# Patient Record
Sex: FEMALE | Employment: PART TIME | URBAN - METROPOLITAN AREA
[De-identification: names, ages, dates, MRNs, and addresses within clinical notes are randomized per-mention and may not be internally consistent; named-entity substitution may affect disease eponyms.]

---

## 2017-02-20 ENCOUNTER — MYC MEDICAL ADVICE (OUTPATIENT)
Dept: ENDOCRINOLOGY | Facility: CLINIC | Age: 67
End: 2017-02-20

## 2017-02-20 ENCOUNTER — OFFICE VISIT (OUTPATIENT)
Dept: ENDOCRINOLOGY | Facility: CLINIC | Age: 67
End: 2017-02-20

## 2017-02-20 VITALS — SYSTOLIC BLOOD PRESSURE: 120 MMHG | HEART RATE: 71 BPM | DIASTOLIC BLOOD PRESSURE: 75 MMHG | WEIGHT: 114.5 LBS

## 2017-02-20 DIAGNOSIS — E04.1 THYROID NODULE: ICD-10-CM

## 2017-02-20 DIAGNOSIS — Z79.890 ENCOUNTER FOR MONITORING TESTOSTERONE REPLACEMENT THERAPY: ICD-10-CM

## 2017-02-20 DIAGNOSIS — E04.1 THYROID NODULE: Primary | ICD-10-CM

## 2017-02-20 DIAGNOSIS — Z51.81 ENCOUNTER FOR MONITORING TESTOSTERONE REPLACEMENT THERAPY: ICD-10-CM

## 2017-02-20 DIAGNOSIS — Z13.220 SCREENING FOR LIPOID DISORDERS: ICD-10-CM

## 2017-02-20 DIAGNOSIS — Z13.820 SCREENING FOR OSTEOPOROSIS: ICD-10-CM

## 2017-02-20 DIAGNOSIS — M84.342S: ICD-10-CM

## 2017-02-20 LAB
CHOLEST SERPL-MCNC: 185 MG/DL
HDLC SERPL-MCNC: 54 MG/DL
LDLC SERPL CALC-MCNC: 109 MG/DL
NONHDLC SERPL-MCNC: 131 MG/DL
T4 FREE SERPL-MCNC: 1.12 NG/DL (ref 0.76–1.46)
THYROPEROXIDASE AB SERPL-ACNC: 23 IU/ML
TRIGL SERPL-MCNC: 109 MG/DL
TSH SERPL DL<=0.05 MIU/L-ACNC: 0.46 MU/L (ref 0.4–4)

## 2017-02-20 PROCEDURE — 84403 ASSAY OF TOTAL TESTOSTERONE: CPT | Performed by: INTERNAL MEDICINE

## 2017-02-20 PROCEDURE — 86376 MICROSOMAL ANTIBODY EACH: CPT | Performed by: INTERNAL MEDICINE

## 2017-02-20 PROCEDURE — 84270 ASSAY OF SEX HORMONE GLOBUL: CPT | Performed by: INTERNAL MEDICINE

## 2017-02-20 RX ORDER — OMEGA-3-ACID ETHYL ESTERS 1 G/1
2 CAPSULE, LIQUID FILLED ORAL 2 TIMES DAILY
COMMUNITY

## 2017-02-20 RX ORDER — TESTOSTERONE 100 MG
PELLET (EA) IMPLANTATION
COMMUNITY

## 2017-02-20 ASSESSMENT — PAIN SCALES - GENERAL: PAINLEVEL: NO PAIN (0)

## 2017-02-20 NOTE — PATIENT INSTRUCTIONS
To expedite your medication refill(s), please contact your pharmacy and have them fax a refill request to: [phone].  *Please allow 3 business days for routine medication refills.  *Please allow 5 business days for controlled substance medication refills.  --------------------  For scheduling appointments (including lab work), please request an appointment through [employer], or call: [phone].    For questions for your provider or the endocrine nurse, please send a [employer] message.  For after-hours urgent issues, please dial [phone], and ask to speak with the Endocrinologist On-Call.  --------------------  Please Note: If you are active on [employer], all future test results will be sent by [employer] message only and will no longer be sent by mail. You may also receive communication directly from your physician.

## 2017-02-20 NOTE — PROGRESS NOTES
- Endocrinology Initial Consultation -    Reason for visit/consult: Thyroid nodule    Primary care provider: Neyda Yusuf    HPI: 65 yo female for the initial visit of her thyroid nodule, referred from [facility], [place] [place].   She was noted for left sided thyroid nodule by physical exams for a few years and last visit, she was told that need to be seen by endocrinologist. She is asymptomatic, no dysphagia.     Of note, she has been taking testosterone chew by compound pharmacy over 3 years, she feels more energetic with testosterone pill. She is also taking progesterone pill as well.     Patient is engaging exercise daily basis, jog, cross-country ski etc, she had left arm fracture twice in the past year. Last bone density test was 5 years ago at [facility].Currently taking Ca citrate (unknown dose) and vitamin D 1000 IU.     Energry level: pretty good, Sleep: good, Appetite: good, BW: regular, no constipation.    Past Medical/Surgical History:  No past medical history on file.  No past surgical history on file.  Two  before    Allergies:  No Known Allergies    Current Medications   Current Outpatient Prescriptions   Medication     PROGESTERONE 200 MG CAPS COMPOUND (PROGESTERONE 200 MG CAPS COMPOUND)     Testosterone 100 MG PLLT     omega-3 acid ethyl esters (LOVAZA) 1 G capsule     Glucosamine-Chondroitin--200-150 MG TABS     B Complex Vitamins (VITAMIN-B COMPLEX PO)     Ascorbic Acid (VITAMIN C PO)     VITAMIN D, CHOLECALCIFEROL, PO     No current facility-administered medications for this visit.    iodine drop once a day  Co Q10 150 mg  Alpha-lipoic acid 150 mg  Vitamin D 1000 IU    Family History:  No family history on file.  Family history of thyroid- maybe one of the sister    Social History:  Social History   Substance Use Topics     Smoking status: Not on file     Smokeless tobacco: Not on file     Alcohol use Not on  file   Lives with , 1 son, 2 daughters. Job: school system, [employer], teaching. Used to work as /     ROS:  Full review of systems taken with the help of the intake sheet. Otherwise a complete 14 point review of systems was taken and is negative unless stated in the history above.    Physical Exam:   Blood pressure 120/75, pulse 71, weight 51.9 kg (114 lb 8 oz).  General: well appearing, no acute distress, pleasant and conversant,   Mental Status/neuro: alert and oriented  Face: symmetrical, normal facial color  Eyes: anicteric, PERRL  Neck: suppler, no lymphadenopahty  Thyroid: normal-smaller size and coarse texture, left lower nodule 2cm  palpable, no bruits  Heart: regular rhythm, S1S2, no murmur appreciated  Lung: clear to auscultation bilaterally  Abdomen: soft, NT/ND, no hepatomegaly  Legs: no swelling or edema      Labs (5/4/2016 [facility]):   Estradiol: 17.5 (0-30)  Progesterone 13.3 (0.2-1.0 menopause range)  Free testosterone 5.2  Total testosterone 47.1 ()  SHBG 66.4   TSH 0.63 (0.4-2.5)  Free T4 1.2 (0.8-1.9)  Total T4 6.9 (4.5-12.5)          Assessment and Plan  66 year old female with thyroid nodule, current testosterone compound taking,    - Thyroid sonogram, then will consider whether needs FNA or not, patient will contact us once finish biospy  - TSH, free T4, TPO antibody  - free testosterone, total testosterone  - lipid panel  - Will verify the dose of calcium since she had two fractures recently.  -RTC with me in one year if no FNA    I spent 45 minutes with this patient face to face and explained the conditions and plans (more than 50% of time was counseling/coordination of care) . The patient understood and is satisfied with today's visit. Return to clinic with me in 1 year.         Ashley Dahl MD  Staff Physician  Endocrinology and Metabolism  License: [id number]

## 2017-02-20 NOTE — LETTER
2017       RE: Kerrie Aguilar  [address] [facility] [address]     Dear Colleague,    Thank you for referring your patient, Kerrie Aguilar, to the [facility] ENDOCRINOLOGY at [facility]. Please see a copy of my visit note below.                                - Endocrinology Initial Consultation -    Reason for visit/consult: Thyroid nodule    Primary care provider: Neyda Yusuf    HPI: 67 yo female for the initial visit of her thyroid nodule, referred from [facility], [facility].   She was noted for left sided thyroid nodule by physical exams for a few years and last visit, she was told that need to be seen by endocrinologist. She is asymptomatic, no dysphagia.     Of note, she has been taking testosterone chew by compound pharmacy over 3 years, she feels more energetic with testosterone pill. She is also taking progesterone pill as well.     Patient is engaging exercise daily basis, jog, cross-country ski etc, she had left arm fracture twice in the past year. Last bone density test was 5 years ago at [facility] [place].Currently taking Ca citrate (unknown dose) and vitamin D 1000 IU.     Energry level: pretty good, Sleep: good, Appetite: good, BW: regular, no constipation.    Past Medical/Surgical History:  No past medical history on file.  No past surgical history on file.  Two  before    Allergies:  No Known Allergies    Current Medications   Current Outpatient Prescriptions   Medication     PROGESTERONE 200 MG CAPS COMPOUND (PROGESTERONE 200 MG CAPS COMPOUND)     Testosterone 100 MG PLLT     omega-3 acid ethyl esters (LOVAZA) 1 G capsule     Glucosamine-Chondroitin--200-150 MG TABS     B Complex Vitamins (VITAMIN-B COMPLEX PO)     Ascorbic Acid (VITAMIN C PO)     VITAMIN D, CHOLECALCIFEROL, PO     No current facility-administered medications for this visit.    iodine drop once a day  Co Q10 150 mg  Alpha-lipoic  acid 150 mg  Vitamin D 1000 IU    Family History:  No family history on file.  Family history of thyroid- maybe one of the sister    Social History:  Social History   Substance Use Topics     Smoking status: Not on file     Smokeless tobacco: Not on file     Alcohol use Not on file   Lives with , 1 son, 2 daughters. Job: school system, [employer], teaching. Used to work as /     ROS:  Full review of systems taken with the help of the intake sheet. Otherwise a complete 14 point review of systems was taken and is negative unless stated in the history above.    Physical Exam:   Blood pressure 120/75, pulse 71, weight 51.9 kg (114 lb 8 oz).  General: well appearing, no acute distress, pleasant and conversant,   Mental Status/neuro: alert and oriented  Face: symmetrical, normal facial color  Eyes: anicteric, PERRL  Neck: suppler, no lymphadenopahty  Thyroid: normal-smaller size and coarse texture, left lower nodule 2cm  palpable, no bruits  Heart: regular rhythm, S1S2, no murmur appreciated  Lung: clear to auscultation bilaterally  Abdomen: soft, NT/ND, no hepatomegaly  Legs: no swelling or edema      Labs (5/4/2016 [facility]):   Estradiol: 17.5 (0-30)  Progesterone 13.3 (0.2-1.0 menopause range)  Free testosterone 5.2  Total testosterone 47.1 ()  SHBG 66.4   TSH 0.63 (0.4-2.5)  Free T4 1.2 (0.8-1.9)  Total T4 6.9 (4.5-12.5)          Assessment and Plan  66 year old female with thyroid nodule, current testosterone compound taking,    - Thyroid sonogram, then will consider whether needs FNA or not, patient will contact us once finish biospy  - TSH, free T4, TPO antibody  - free testosterone, total testosterone  - lipid panel  - Will verify the dose of calcium since she had two fractures recently.  -RTC with me in one year if no FNA    I spent 45 minutes with this patient face to face and explained the conditions and plans (more than 50% of time was  counseling/coordination of care) . The patient understood and is satisfied with today's visit. Return to clinic with me in 1 year.         Ashley Dahl MD  Staff Physician  Endocrinology and Metabolism  License: [id number]

## 2017-02-20 NOTE — NURSING NOTE
Chief Complaint   Patient presents with     Consult     NEW PATIENT-THYROID NODULE     Kalpana Stearns, IVETTE  Endocrinology & Diabetes 3G

## 2017-02-20 NOTE — MR AVS SNAPSHOT
After Visit Summary   2/20/2017    Kerrie Aguilar    MRN: [id number]           Patient Information     Date Of Birth          1950        Visit Information        Provider Department      2/20/2017 8:00 AM Ashley Dahl MD [facility]        Today's Diagnoses     Thyroid nodule    -  1    Screening for lipoid disorders        Encounter for monitoring testosterone replacement therapy        Stress fracture of left hand, sequela        Screening for osteoporosis          Care Instructions    To expedite your medication refill(s), please contact your pharmacy and have them fax a refill request to: [phone].  *Please allow 3 business days for routine medication refills.  *Please allow 5 business days for controlled substance medication refills.  --------------------  For scheduling appointments (including lab work), please request an appointment through [employer]PO, or call: [phone].    For questions for your provider or the endocrine nurse, please send a RPO message.  For after-hours urgent issues, please dial [phone], and ask to speak with the Endocrinologist On-Call.  --------------------  Please Note: If you are active on RPO, all future test results will be sent by RPO message only and will no longer be sent by mail. You may also receive communication directly from your physician.          Follow-ups after your visit        Your next 10 appointments already scheduled     Feb 20, 2017  9:15 AM CST   LAB with [facility] ([facility])    [address] [place] [id number] [phone]           Patient must bring picture ID.  Patient should be prepared to give a urine specimen  Please do not eat 10-12 hours before your appointment if you are coming in fasting for labs on lipids, cholesterol, or glucose (sugar).  Pregnant women should follow their Care Team instructions. Water with medications is okay. Do  not drink coffee or other fluids.   If you have concerns about taking  your medications, please ask at office or if scheduling via [employer] send a message by clicking on Secure Messaging, Message Your Care Team.            Feb 20, 2017 10:00 AM CST   US THYROID with UCUS3   [facility] ([facility])    [address] [address] [phone]           Please bring a list of your medicines (including vitamins, minerals and over-the-counter drugs). Also, tell your doctor about any allergies you may have. Wear comfortable clothes and leave your valuables at home.  You do not need to do anything special to prepare for your exam.  Please call the Imaging Department at your exam site with any questions.              Future tests that were ordered for you today     Open Future Orders        Priority Expected Expires Ordered    Thyroid peroxidase antibody Routine  2/20/2018 2/20/2017    Testosterone Free and Total Routine  2/20/2018 2/20/2017    Lipid Profile Routine  2/20/2018 2/20/2017    TSH Routine  2/20/2018 2/20/2017    T4 free Routine  2/20/2018 2/20/2017    US Thyroid Routine  9/18/2017 2/20/2017            Who to contact     Please call your clinic at [phone] to:    Ask questions about your health    Make or cancel appointments    Discuss your medicines    Learn about your test results    Speak to your doctor   If you have compliments or concerns about an experience at your clinic, or if you wish to file a complaint, please contact [facility] Patient Relations at [phone] or email us at [email or web address]         Additional Information About Your Visit        [employer] Information     [employer] gives you secure access to your electronic health record. If you see a primary care provider, you can also send messages to your care team and make appointments. If you have questions, please call your  primary care clinic.  If you do not have a primary care provider, please call [phone] and they will assist you.      [employer] is an electronic gateway that provides easy, online access to your medical records. With [employer], you can request a clinic appointment, read your test results, renew a prescription or communicate with your care team.     To access your existing account, please contact your [facility] or call [phone] for assistance.        Care EveryWhere ID     This is your Care EveryWhere ID. This could be used by other organizations to access your [place] medical records  [id number]        Your Vitals Were     Pulse                   71            Blood Pressure from Last 3 Encounters:   02/20/17 120/75    Weight from Last 3 Encounters:   02/20/17 51.9 kg (114 lb 8 oz)               Primary Care Provider Office Phone # Fax #    Enyda Heather Yusuf [phone]       Merkle PA [address]  [facility] [address]        Thank you!     Thank you for choosing [facility] ENDOCRINOLOGY  for your care. Our goal is always to provide you with excellent care. Hearing back from our patients is one way we can continue to improve our services. Please take a few minutes to complete the written survey that you may receive in the mail after your visit with us. Thank you!             Your Updated Medication List - Protect others around you: Learn how to safely use, store and throw away your medicines at www.disposemymeds.org.          This list is accurate as of: 2/20/17  8:26 AM.  Always use your most recent med list.                   Brand Name Dispense Instructions for use    Glucosamine-Chondroitin--200-150 MG Tabs          omega-3 acid ethyl esters 1 G capsule    Lovaza     Take 2 g by mouth 2 times daily       PROGESTERONE 200 MG CAPS COMPOUND    PROGESTERONE 200 MG CAPS COMPOUND     Take 2 capsules by mouth At Bedtime        Testosterone 100 MG Pllt          VITAMIN C PO          VITAMIN D (CHOLECALCIFEROL) PO      Take by mouth daily       VITAMIN-B COMPLEX PO

## 2017-02-21 ENCOUNTER — MYC MEDICAL ADVICE (OUTPATIENT)
Dept: ENDOCRINOLOGY | Facility: CLINIC | Age: 67
End: 2017-02-21

## 2017-02-21 DIAGNOSIS — E04.1 THYROID NODULE: Primary | ICD-10-CM

## 2017-02-21 LAB
SHBG SERPL-SCNC: 73 NMOL/L (ref 30–135)
TESTOST FREE SERPL-MCNC: 0.15 NG/DL (ref 0.06–0.38)
TESTOST SERPL-MCNC: 16 NG/DL (ref 8–60)

## 2017-02-27 PROCEDURE — 88173 CYTOPATH EVAL FNA REPORT: CPT | Mod: 91 | Performed by: INTERNAL MEDICINE

## 2017-02-27 PROCEDURE — 88172 CYTP DX EVAL FNA 1ST EA SITE: CPT | Performed by: INTERNAL MEDICINE

## 2017-03-02 NOTE — TELEPHONE ENCOUNTER
FNA biopsy fo the two nodules were negative, called the patient and sent a letter. Next sonogram would be in 3 years.    Ashley Dahl MD  Staff Physician  Endocrinology and Metabolism  [facility]  License: [id number]  Pager: [phone]

## 2019-11-08 ENCOUNTER — HEALTH MAINTENANCE LETTER (OUTPATIENT)
Age: 69
End: 2019-11-08

## 2020-02-23 ENCOUNTER — HEALTH MAINTENANCE LETTER (OUTPATIENT)
Age: 70
End: 2020-02-23

## 2020-12-06 ENCOUNTER — HEALTH MAINTENANCE LETTER (OUTPATIENT)
Age: 70
End: 2020-12-06

## 2021-04-11 ENCOUNTER — HEALTH MAINTENANCE LETTER (OUTPATIENT)
Age: 71
End: 2021-04-11

## 2021-09-25 ENCOUNTER — HEALTH MAINTENANCE LETTER (OUTPATIENT)
Age: 71
End: 2021-09-25

## 2021-11-20 ENCOUNTER — HEALTH MAINTENANCE LETTER (OUTPATIENT)
Age: 71
End: 2021-11-20

## 2022-05-07 ENCOUNTER — HEALTH MAINTENANCE LETTER (OUTPATIENT)
Age: 72
End: 2022-05-07

## 2023-01-07 ENCOUNTER — HEALTH MAINTENANCE LETTER (OUTPATIENT)
Age: 73
End: 2023-01-07

## 2023-06-02 ENCOUNTER — HEALTH MAINTENANCE LETTER (OUTPATIENT)
Age: 73
End: 2023-06-02

## 2023-12-02 ENCOUNTER — HEALTH MAINTENANCE LETTER (OUTPATIENT)
Age: 73
End: 2023-12-02